# Patient Record
Sex: MALE | Race: ASIAN | NOT HISPANIC OR LATINO
[De-identification: names, ages, dates, MRNs, and addresses within clinical notes are randomized per-mention and may not be internally consistent; named-entity substitution may affect disease eponyms.]

---

## 2024-09-21 ENCOUNTER — NON-APPOINTMENT (OUTPATIENT)
Age: 34
End: 2024-09-21

## 2024-09-25 ENCOUNTER — OFFICE (OUTPATIENT)
Dept: URBAN - METROPOLITAN AREA CLINIC 92 | Facility: CLINIC | Age: 34
Setting detail: OPHTHALMOLOGY
End: 2024-09-25
Payer: COMMERCIAL

## 2024-09-25 DIAGNOSIS — H01.002: ICD-10-CM

## 2024-09-25 DIAGNOSIS — H01.005: ICD-10-CM

## 2024-09-25 DIAGNOSIS — H01.001: ICD-10-CM

## 2024-09-25 DIAGNOSIS — H01.004: ICD-10-CM

## 2024-09-25 DIAGNOSIS — H16.223: ICD-10-CM

## 2024-09-25 PROCEDURE — 92012 INTRM OPH EXAM EST PATIENT: CPT | Performed by: OPHTHALMOLOGY

## 2024-09-25 ASSESSMENT — LID EXAM ASSESSMENTS
OS_BLEPHARITIS: T
OD_BLEPHARITIS: T

## 2024-10-03 ENCOUNTER — NON-APPOINTMENT (OUTPATIENT)
Age: 34
End: 2024-10-03

## 2024-10-04 ENCOUNTER — APPOINTMENT (OUTPATIENT)
Dept: OTOLARYNGOLOGY | Facility: CLINIC | Age: 34
End: 2024-10-04
Payer: COMMERCIAL

## 2024-10-04 VITALS
DIASTOLIC BLOOD PRESSURE: 63 MMHG | HEART RATE: 73 BPM | HEIGHT: 67 IN | OXYGEN SATURATION: 100 % | BODY MASS INDEX: 23.54 KG/M2 | SYSTOLIC BLOOD PRESSURE: 100 MMHG | WEIGHT: 150 LBS | TEMPERATURE: 98 F

## 2024-10-04 DIAGNOSIS — M26.609 UNSPECIFIED TEMPOROMANDIBULAR JOINT DISORDER: ICD-10-CM

## 2024-10-04 DIAGNOSIS — Z78.9 OTHER SPECIFIED HEALTH STATUS: ICD-10-CM

## 2024-10-04 DIAGNOSIS — H69.90 UNSPECIFIED EUSTACHIAN TUBE DISORDER, UNSPECIFIED EAR: ICD-10-CM

## 2024-10-04 DIAGNOSIS — R68.89 OTHER GENERAL SYMPTOMS AND SIGNS: ICD-10-CM

## 2024-10-04 DIAGNOSIS — H93.8X1 OTHER SPECIFIED DISORDERS OF RIGHT EAR: ICD-10-CM

## 2024-10-04 DIAGNOSIS — H61.23 IMPACTED CERUMEN, BILATERAL: ICD-10-CM

## 2024-10-04 PROBLEM — Z00.00 ENCOUNTER FOR PREVENTIVE HEALTH EXAMINATION: Status: ACTIVE | Noted: 2024-10-04

## 2024-10-04 PROCEDURE — 92557 COMPREHENSIVE HEARING TEST: CPT

## 2024-10-04 PROCEDURE — 99204 OFFICE O/P NEW MOD 45 MIN: CPT | Mod: 25

## 2024-10-04 PROCEDURE — 31231 NASAL ENDOSCOPY DX: CPT

## 2024-10-04 PROCEDURE — 92567 TYMPANOMETRY: CPT

## 2024-10-04 PROCEDURE — G0268 REMOVAL OF IMPACTED WAX MD: CPT

## 2024-10-04 RX ORDER — FLUTICASONE PROPIONATE 50 UG/1
50 SPRAY, METERED NASAL
Qty: 16 | Refills: 0 | Status: ACTIVE | COMMUNITY
Start: 2024-10-04 | End: 1900-01-01

## 2024-10-04 RX ORDER — LORATADINE 5 MG/5 ML
0.05 SOLUTION, ORAL ORAL TWICE DAILY
Qty: 1 | Refills: 0 | Status: ACTIVE | COMMUNITY
Start: 2024-10-04 | End: 1900-01-01

## 2024-10-04 RX ORDER — DOXYCYCLINE HYCLATE 100 MG/1
100 CAPSULE ORAL
Refills: 0 | Status: ACTIVE | COMMUNITY

## 2024-10-04 RX ORDER — VENLAFAXINE HYDROCHLORIDE 150 MG/1
150 CAPSULE, EXTENDED RELEASE ORAL
Refills: 0 | Status: ACTIVE | COMMUNITY

## 2024-10-05 PROBLEM — R68.89 JAW SYMPTOM: Status: ACTIVE | Noted: 2024-10-05

## 2024-10-05 PROBLEM — H69.90 ACUTE DYSFUNCTION OF EUSTACHIAN TUBE, UNSPECIFIED LATERALITY: Status: ACTIVE | Noted: 2024-10-05

## 2024-10-05 NOTE — PROCEDURE
[Cerumen Impaction] : Cerumen Impaction [Same] : same as the Pre Op Dx. [] : Removal of Cerumen [Anterior rhinoscopy insufficient to account for symptoms] : anterior rhinoscopy insufficient to account for symptoms [Topical Lidocaine] : topical lidocaine [Oxymetazoline HCl] : oxymetazoline HCl [Flexible Endoscope] : examined with the flexible endoscope [Posterior Lesion] : posterior lesion [Serial Number: ___] : Serial Number: [unfilled] [Normal] : the paranasal sinuses had no abnormalities [FreeTextEntry6] : done to r/o npx mass, check omu patency due to etd Mucosa- b nl Mucous- b nl Polyps- b nl  Inferior turbinate- b nl Middle turbinate- b nl Superior turbinate- b nl Inferior meatus- b nl Middle meatus- b nl Superior meatus- b nl Spheno-ethmoidal recess- b nl anterior rhinoscopy insuffcient to account for symptoms

## 2024-10-05 NOTE — HISTORY OF PRESENT ILLNESS
[de-identified] : 33 y/o M here to check ears for eustachian tube dysfunction or other abnormality. He noted some ear drainage b 2 months ago, resolved on its own. He describes drainage as solid-like. He also reports problems with equalizing pressure of b ears when scuba diving 4 months ago. Patient reports last scuba diving event was 4 months ago. Patient states that he was able to clear ears prior, but now patient is only able to clear L ear. He has h/o bruxism - wears mouth guard.  He s currently on doxycycline for acne (100mg BID x14 days), he has 2 more days left. Denies cotton swab use, hearing loss, recurrent ear infection during childhood, dizziness, otalgia. No FH pertinent to cc. Non smoker. Not about to take another diving trip.

## 2024-10-05 NOTE — PHYSICAL EXAM
[] : septum deviated to the left [Midline] : trachea located in midline position [de-identified] : b [de-identified] : b copious cerumen noted, removed atraumatically with suction, b tm intact [Nasal Endoscopy Performed] : nasal endoscopy was performed, see procedure section for findings [Normal] : no rashes [de-identified] : gait steady

## 2024-10-05 NOTE — HISTORY OF PRESENT ILLNESS
[de-identified] : 35 y/o M here to check ears for eustachian tube dysfunction or other abnormality. He noted some ear drainage b 2 months ago, resolved on its own. He describes drainage as solid-like. He also reports problems with equalizing pressure of b ears when scuba diving 4 months ago. Patient reports last scuba diving event was 4 months ago. Patient states that he was able to clear ears prior, but now patient is only able to clear L ear. He has h/o bruxism - wears mouth guard.  He s currently on doxycycline for acne (100mg BID x14 days), he has 2 more days left. Denies cotton swab use, hearing loss, recurrent ear infection during childhood, dizziness, otalgia. No FH pertinent to cc. Non smoker. Not about to take another diving trip.

## 2024-10-05 NOTE — PHYSICAL EXAM
[] : septum deviated to the left [Midline] : trachea located in midline position [de-identified] : b [de-identified] : b copious cerumen noted, removed atraumatically with suction, b tm intact [Nasal Endoscopy Performed] : nasal endoscopy was performed, see procedure section for findings [Normal] : no rashes [de-identified] : gait steady

## 2024-10-05 NOTE — PROCEDURE
[Cerumen Impaction] : Cerumen Impaction [Same] : same as the Pre Op Dx. [] : Removal of Cerumen [Anterior rhinoscopy insufficient to account for symptoms] : anterior rhinoscopy insufficient to account for symptoms [Topical Lidocaine] : topical lidocaine [Oxymetazoline HCl] : oxymetazoline HCl [Flexible Endoscope] : examined with the flexible endoscope [Posterior Lesion] : posterior lesion [Serial Number: ___] : Serial Number: [unfilled] [Normal] : the paranasal sinuses had no abnormalities [FreeTextEntry6] : b copious cerumen noted, removed atraumatically with suction, b tm intact

## 2024-10-05 NOTE — ASSESSMENT
[FreeTextEntry1] : 1. cerumen impaction, b  -b copious cerumen noted, removed atraumatically with suction, b tm intact -ears felt better -avoid qtip use  2. TMJ -continue wearing mouth guard -soft foods -advil/motrin -f/u with dentist  -Referral to Dr. Aranda provided explained tmj can relate to etd  3. pressure in r ear -audio exam: hearing wnl au; tympanogram: Type A AU tymps; abnormal dynamic; ETF abnormal AU - -results reviewed with patient -afrin x5 days -flonase (no h/o cataracts of glaucoma) discussed po corticosteroids- he prefers to hold off due to current acne treatment -RTC in x3 weeks with repeat ett

## 2024-10-25 ENCOUNTER — APPOINTMENT (OUTPATIENT)
Dept: OTOLARYNGOLOGY | Facility: CLINIC | Age: 34
End: 2024-10-25
Payer: COMMERCIAL

## 2024-10-25 VITALS
SYSTOLIC BLOOD PRESSURE: 109 MMHG | TEMPERATURE: 98 F | HEART RATE: 76 BPM | HEIGHT: 67 IN | WEIGHT: 150 LBS | BODY MASS INDEX: 23.54 KG/M2 | OXYGEN SATURATION: 100 % | DIASTOLIC BLOOD PRESSURE: 65 MMHG

## 2024-10-25 DIAGNOSIS — H69.90 UNSPECIFIED EUSTACHIAN TUBE DISORDER, UNSPECIFIED EAR: ICD-10-CM

## 2024-10-25 PROCEDURE — 92567 TYMPANOMETRY: CPT

## 2024-10-25 PROCEDURE — 99213 OFFICE O/P EST LOW 20 MIN: CPT

## 2024-10-29 ENCOUNTER — APPOINTMENT (OUTPATIENT)
Dept: OTOLARYNGOLOGY | Facility: CLINIC | Age: 34
End: 2024-10-29
Payer: COMMERCIAL

## 2024-10-29 VITALS
HEIGHT: 67 IN | HEART RATE: 60 BPM | DIASTOLIC BLOOD PRESSURE: 75 MMHG | SYSTOLIC BLOOD PRESSURE: 111 MMHG | OXYGEN SATURATION: 100 % | WEIGHT: 132.28 LBS | TEMPERATURE: 98.3 F | BODY MASS INDEX: 20.76 KG/M2

## 2024-10-29 DIAGNOSIS — R68.89 OTHER GENERAL SYMPTOMS AND SIGNS: ICD-10-CM

## 2024-10-29 DIAGNOSIS — H69.93 UNSPECIFIED EUSTACHIAN TUBE DISORDER, BILATERAL: ICD-10-CM

## 2024-10-29 PROCEDURE — 99213 OFFICE O/P EST LOW 20 MIN: CPT

## 2024-11-11 ENCOUNTER — NON-APPOINTMENT (OUTPATIENT)
Age: 34
End: 2024-11-11

## 2024-11-11 RX ORDER — OMEPRAZOLE 40 MG/1
40 CAPSULE, DELAYED RELEASE ORAL
Qty: 7 | Refills: 0 | Status: ACTIVE | COMMUNITY
Start: 2024-11-11 | End: 1900-01-01

## 2024-11-11 RX ORDER — METHYLPREDNISOLONE 4 MG/1
4 TABLET ORAL
Qty: 1 | Refills: 0 | Status: ACTIVE | COMMUNITY
Start: 2024-11-11 | End: 1900-01-01

## 2025-01-10 ENCOUNTER — APPOINTMENT (OUTPATIENT)
Dept: ORTHOPEDIC SURGERY | Facility: CLINIC | Age: 35
End: 2025-01-10

## 2025-01-10 VITALS — BODY MASS INDEX: 20.72 KG/M2 | HEIGHT: 67 IN | WEIGHT: 132 LBS

## 2025-01-10 DIAGNOSIS — M54.50 LOW BACK PAIN, UNSPECIFIED: ICD-10-CM

## 2025-01-10 DIAGNOSIS — M54.16 RADICULOPATHY, LUMBAR REGION: ICD-10-CM

## 2025-01-10 PROCEDURE — 99204 OFFICE O/P NEW MOD 45 MIN: CPT

## 2025-01-10 RX ORDER — CYCLOBENZAPRINE HYDROCHLORIDE 5 MG/1
5 TABLET, FILM COATED ORAL 3 TIMES DAILY
Qty: 30 | Refills: 0 | Status: ACTIVE | COMMUNITY
Start: 2025-01-10 | End: 1900-01-01

## 2025-01-21 ENCOUNTER — APPOINTMENT (OUTPATIENT)
Dept: PHYSICAL MEDICINE AND REHAB | Facility: CLINIC | Age: 35
End: 2025-01-21
Payer: COMMERCIAL

## 2025-01-21 ENCOUNTER — APPOINTMENT (OUTPATIENT)
Dept: ORTHOPEDIC SURGERY | Facility: CLINIC | Age: 35
End: 2025-01-21
Payer: COMMERCIAL

## 2025-01-21 DIAGNOSIS — M54.16 RADICULOPATHY, LUMBAR REGION: ICD-10-CM

## 2025-01-21 DIAGNOSIS — M51.26 OTHER INTERVERTEBRAL DISC DISPLACEMENT, LUMBAR REGION: ICD-10-CM

## 2025-01-21 DIAGNOSIS — M54.50 LOW BACK PAIN, UNSPECIFIED: ICD-10-CM

## 2025-01-21 PROCEDURE — 99204 OFFICE O/P NEW MOD 45 MIN: CPT

## 2025-01-21 PROCEDURE — 99214 OFFICE O/P EST MOD 30 MIN: CPT

## 2025-01-21 RX ORDER — DICLOFENAC SODIUM 75 MG/1
75 TABLET, DELAYED RELEASE ORAL
Qty: 60 | Refills: 0 | Status: ACTIVE | COMMUNITY
Start: 2025-01-21 | End: 1900-01-01

## 2025-01-29 ENCOUNTER — NON-APPOINTMENT (OUTPATIENT)
Age: 35
End: 2025-01-29

## 2025-01-30 ENCOUNTER — NON-APPOINTMENT (OUTPATIENT)
Age: 35
End: 2025-01-30

## 2025-02-03 ENCOUNTER — APPOINTMENT (OUTPATIENT)
Dept: PHYSICAL MEDICINE AND REHAB | Facility: CLINIC | Age: 35
End: 2025-02-03

## 2025-05-07 ENCOUNTER — RX ONLY (RX ONLY)
Age: 35
End: 2025-05-07

## 2025-05-07 ENCOUNTER — OFFICE (OUTPATIENT)
Dept: URBAN - METROPOLITAN AREA CLINIC 92 | Facility: CLINIC | Age: 35
Setting detail: OPHTHALMOLOGY
End: 2025-05-07
Payer: COMMERCIAL

## 2025-05-07 DIAGNOSIS — H01.001: ICD-10-CM

## 2025-05-07 DIAGNOSIS — H16.223: ICD-10-CM

## 2025-05-07 DIAGNOSIS — H01.002: ICD-10-CM

## 2025-05-07 DIAGNOSIS — H01.005: ICD-10-CM

## 2025-05-07 DIAGNOSIS — H01.004: ICD-10-CM

## 2025-05-07 PROCEDURE — 92014 COMPRE OPH EXAM EST PT 1/>: CPT | Performed by: OPHTHALMOLOGY

## 2025-05-07 ASSESSMENT — PUNCTA - ASSESSMENT
OD_PUNCTA: SIL PLUG
OS_PUNCTA: SIL PLUG

## 2025-05-07 ASSESSMENT — CONFRONTATIONAL VISUAL FIELD TEST (CVF)
OD_FINDINGS: FULL
OS_FINDINGS: FULL

## 2025-05-07 ASSESSMENT — LID EXAM ASSESSMENTS
OD_BLEPHARITIS: T
OS_BLEPHARITIS: T

## 2025-05-07 ASSESSMENT — TEAR BREAK UP TIME (TBUT)
OD_TBUT: T
OS_TBUT: T

## 2025-05-07 ASSESSMENT — REFRACTION_AUTOREFRACTION
OD_AXIS: 086
OS_SPHERE: -0.25
OD_CYLINDER: +0.50
OD_SPHERE: 0.00
OS_AXIS: 084
OS_CYLINDER: +0.50

## 2025-05-07 ASSESSMENT — TONOMETRY
OD_IOP_MMHG: 11
OS_IOP_MMHG: 15

## 2025-05-07 ASSESSMENT — KERATOMETRY
OS_K1POWER_DIOPTERS: 38.25
METHOD_AUTO_MANUAL: AUTO
OS_AXISANGLE_DEGREES: 088
OS_K2POWER_DIOPTERS: 39.50
OD_K2POWER_DIOPTERS: 39.25
OD_K1POWER_DIOPTERS: 38.25
OD_AXISANGLE_DEGREES: 088

## 2025-05-07 ASSESSMENT — DECREASING TEAR LAKE - SEVERITY SCORE
OS_DEC_TEARLAKE: T
OD_DEC_TEARLAKE: ABSENT

## 2025-05-07 ASSESSMENT — SUPERFICIAL PUNCTATE KERATITIS (SPK)
OD_SPK: T
OS_SPK: T

## 2025-05-07 ASSESSMENT — VISUAL ACUITY
OD_BCVA: 20/20
OS_BCVA: 20/20

## 2025-05-07 ASSESSMENT — CORNEAL SURGICAL SCARRING: OS_SCARRING: MID
